# Patient Record
Sex: FEMALE | Race: WHITE | NOT HISPANIC OR LATINO | URBAN - METROPOLITAN AREA
[De-identification: names, ages, dates, MRNs, and addresses within clinical notes are randomized per-mention and may not be internally consistent; named-entity substitution may affect disease eponyms.]

---

## 2020-07-17 ENCOUNTER — TELEPHONE ENCOUNTER (OUTPATIENT)
Dept: URBAN - METROPOLITAN AREA CLINIC 92 | Facility: CLINIC | Age: 75
End: 2020-07-17

## 2020-07-17 RX ORDER — PANTOPRAZOLE SODIUM 40 MG/1
1 TABLET TABLET, DELAYED RELEASE ORAL ONCE A DAY
Qty: 90 | Refills: 3
Start: 2020-03-05

## 2021-08-30 ENCOUNTER — TELEPHONE ENCOUNTER (OUTPATIENT)
Dept: URBAN - METROPOLITAN AREA CLINIC 23 | Facility: CLINIC | Age: 76
End: 2021-08-30

## 2021-08-30 RX ORDER — PANTOPRAZOLE SODIUM 40 MG/1
1 TABLET TABLET, DELAYED RELEASE ORAL ONCE A DAY
Qty: 90 | Refills: 3
Start: 2021-08-30

## 2024-07-17 ENCOUNTER — OFFICE VISIT (OUTPATIENT)
Dept: URBAN - METROPOLITAN AREA CLINIC 111 | Facility: CLINIC | Age: 79
End: 2024-07-17
Payer: MEDICARE

## 2024-07-17 ENCOUNTER — DASHBOARD ENCOUNTERS (OUTPATIENT)
Age: 79
End: 2024-07-17

## 2024-07-17 VITALS
HEIGHT: 63 IN | HEART RATE: 90 BPM | WEIGHT: 104.4 LBS | TEMPERATURE: 98.1 F | BODY MASS INDEX: 18.5 KG/M2 | DIASTOLIC BLOOD PRESSURE: 67 MMHG | SYSTOLIC BLOOD PRESSURE: 110 MMHG

## 2024-07-17 DIAGNOSIS — D64.89 OTHER SPECIFIED ANEMIAS: ICD-10-CM

## 2024-07-17 DIAGNOSIS — K21.9 GERD WITH ESOPHAGITIS: ICD-10-CM

## 2024-07-17 DIAGNOSIS — R11.0 NAUSEA: ICD-10-CM

## 2024-07-17 DIAGNOSIS — K57.30 DIVERTICULAR DISEASE OF LEFT COLON: ICD-10-CM

## 2024-07-17 PROCEDURE — 99244 OFF/OP CNSLTJ NEW/EST MOD 40: CPT | Performed by: PHYSICIAN ASSISTANT

## 2024-07-17 PROCEDURE — 99204 OFFICE O/P NEW MOD 45 MIN: CPT | Performed by: PHYSICIAN ASSISTANT

## 2024-07-17 RX ORDER — PANTOPRAZOLE SODIUM 40 MG/1
1 TABLET TABLET, DELAYED RELEASE ORAL ONCE A DAY
Qty: 90 | Refills: 3 | Status: ACTIVE | COMMUNITY
Start: 2021-08-30

## 2024-07-17 RX ORDER — PANTOPRAZOLE SODIUM 40 MG/1
1 TABLET TABLET, DELAYED RELEASE ORAL TWICE DAILY
Qty: 180 | Refills: 1 | OUTPATIENT
Start: 2024-07-17

## 2024-07-17 NOTE — HPI-TODAY'S VISIT:
78 y/o female here with hernia and to follow up on recent GI bleed. This patient was referred by Dr. Tameka Trinidad. A copy of this will be sent to the referring provider. S/p EGD in 2019 by Dr. Allen revealing possible candida in esophagus, multiple gastric erosions in stomach, and erosions in duodenum. Path unimpressive. S/p colonoscopy in 2018 by Dr. Thornton revealing diverticulosis in L colon and non-bleeding internal hemorrhoids. Path revealing sessile serrated adenoma and repeat colon recommended in 5 years.  Hospital records from recent hospitalization at [facility] reviewed from May. EGD on 5/26 revealing LA Grade D reflux esophagitis with no bleeding, 3 cm hiatal hernia, and gastritis. S/p colonoscopy on 5/25 revealing diverticulosis and no blood in the colon. Unsuccessful completion of the procedure to transverse colon despite applying abdominal pressure and changing to pediatric scope. Large ventral hernia noted. Pt came in with Hgb in 4 range amd got 3 units PRBC. CT a/p GI bleed protocol w/ no active bleeding. Severe diverticulosis noted. Marked thickening of distal esophagus noted. Portion of transvere colon contained within a large ventral abdominal hernia. Moderate fecal loading noted in colon. U/S of liver in 2/2024 with sludge and cysts in liver & R kidney.   Pt is here with her  who helps give history. She has a hernia and  states colon was not able to be completed d/t hernia. Reports hernia has been protruding for quite some time-  thinks a year. She has not seen surgery. They report she is also having trouble digesting food. She has had a lot of vomiting and has been to the hospital 3-4 times in the past year with last visit in May noted above. No recent hematemesis. She is taking nausea medicine which is helping her not vomit. She is taking medicine for reflux. She is also taking Sucralfate. They do not know the names of all of her meds. Pt had hiatal hernia surgery at [facility] about 10 years ago. She is having stools regularly. No rectal bleeding. She is doing a liquid diet currently and protein shakes. She is not hungry so not eating much solid food. She has occasional stomach pain.

## 2024-07-17 NOTE — PHYSICAL EXAM GASTROINTESTINAL
Abdomen, soft, protruding ventral hernia noted, nontender, nondistended, no guarding or rigidity, no masses palpable, normal bowel sounds, Liver and Spleen, no hepatosplenomegaly, Rectal, deferred

## 2024-07-19 ENCOUNTER — P2P PATIENT RECORD (OUTPATIENT)
Age: 79
End: 2024-07-19

## 2024-09-18 ENCOUNTER — OFFICE VISIT (OUTPATIENT)
Dept: URBAN - METROPOLITAN AREA CLINIC 111 | Facility: CLINIC | Age: 79
End: 2024-09-18

## 2025-01-13 ENCOUNTER — ERX REFILL RESPONSE (OUTPATIENT)
Dept: URBAN - METROPOLITAN AREA CLINIC 111 | Facility: CLINIC | Age: 80
End: 2025-01-13

## 2025-01-13 RX ORDER — PANTOPRAZOLE SODIUM 40 MG/1
1 TABLET TABLET, DELAYED RELEASE ORAL TWICE DAILY
Qty: 180 | Refills: 1 | OUTPATIENT

## 2025-01-13 RX ORDER — PANTOPRAZOLE SODIUM 40 MG/1
TAKE 1 TABLET BY MOUTH TWICE A DAY FOR 90 DAYS TABLET, DELAYED RELEASE ORAL
Qty: 180 TABLET | Refills: 1 | OUTPATIENT

## 2025-04-23 ENCOUNTER — OFFICE VISIT (OUTPATIENT)
Dept: URBAN - METROPOLITAN AREA CLINIC 111 | Facility: CLINIC | Age: 80
End: 2025-04-23

## 2025-04-23 RX ORDER — PANTOPRAZOLE SODIUM 40 MG/1
TAKE 1 TABLET BY MOUTH TWICE A DAY FOR 90 DAYS TABLET, DELAYED RELEASE ORAL
Qty: 180 TABLET | Refills: 1 | Status: ACTIVE | COMMUNITY